# Patient Record
Sex: MALE | Race: WHITE
[De-identification: names, ages, dates, MRNs, and addresses within clinical notes are randomized per-mention and may not be internally consistent; named-entity substitution may affect disease eponyms.]

---

## 2018-02-27 ENCOUNTER — HOSPITAL ENCOUNTER (EMERGENCY)
Dept: HOSPITAL 17 - PHED | Age: 58
Discharge: HOME | End: 2018-02-27
Payer: COMMERCIAL

## 2018-02-27 VITALS
OXYGEN SATURATION: 99 % | HEART RATE: 94 BPM | TEMPERATURE: 98.4 F | RESPIRATION RATE: 18 BRPM | SYSTOLIC BLOOD PRESSURE: 155 MMHG | DIASTOLIC BLOOD PRESSURE: 80 MMHG

## 2018-02-27 VITALS — BODY MASS INDEX: 22.84 KG/M2 | WEIGHT: 163.14 LBS | HEIGHT: 71 IN

## 2018-02-27 DIAGNOSIS — L08.89: Primary | ICD-10-CM

## 2018-02-27 DIAGNOSIS — Z23: ICD-10-CM

## 2018-02-27 PROCEDURE — 90471 IMMUNIZATION ADMIN: CPT

## 2018-02-27 PROCEDURE — 90714 TD VACC NO PRESV 7 YRS+ IM: CPT

## 2018-02-27 NOTE — PD
HPI


Chief Complaint:  Bite or Sting


Time Seen by Provider:  20:13


Travel History


International Travel<30 days:  No


Contact w/Intl Traveler<30days:  No


Traveled to known affect area:  No





History of Present Illness


HPI


57-year-old male that presents to the ED for evaluation of possible bite to his 

left middle finger.  Per patient this happened about a week ago.  Per patient 

he had 2 puncture wounds.  He wasn't sure what bit him.  He was seen on 22 December in [facility] and had an x-ray and off for pain medication.  Per 

patient he was doing okay except that his been having some redness and 

difficulty flexing the finger.  Denies any numbness, tilling, weakness.  No 

fevers chills or sweats.  He is a diabetic.  He has not gotten a tetanus 

booster in some time.  He has no allergies to medication.  No other medical 

issues.  Pain per patient is 6 out of 10 and gets worse with bending of the 

digit.  Puncture wounds are to the medial aspect of the left third digit.





PFSH


Past Medical History


Depression:  Yes


High Cholesterol:  Yes


Cerebrovascular Accident:  No


Diabetes:  Yes


Patient Takes Glucophage:  No


Diminished Hearing:  No


Gastrointestinal Disorders:  No


Headaches:  Yes


Hypertension:  Yes


Immunizations Current:  Yes


Myocardial Infarction:  No


Renal Failure:  No


Pregnant?:  Not Pregnant





Past Surgical History


Abdominal Surgery:  Yes (hernia repair)





Social History


Alcohol Use:  No


Tobacco Use:  No


Substance Use:  No





Allergies-Medications


(Allergen,Severity, Reaction):  


Coded Allergies:  


     No Known Allergies (Verified , 5/3/15)


Reported Meds & Prescriptions





Reported Meds & Active Scripts


Active


Diclofenac Sodium DR (Diclofenac Sodium) 75 Mg Tabdr 75 Mg PO BID PRN


Bactrim DS (Sulfamethoxazole-Trimethoprim) 800-160 Mg Tab 1 Tab PO BID 10 Days


Diclofenac Potassium 50 Mg Tab 1 Tab PO Q8 PRN


Reported


Imitrex (Sumatriptan Succinate) 50 Mg Tab 50 Mg PO BIDPRN


Cymbalta (Duloxetine HCl) 30 Mg Cap 30 Mg PO DAILY


Gabapentin 600 Mg Tab 600 Mg PO DAILY


Wellbutrin 150 Mg Tab Xl (Bupropion Hcl) 150 Mg Neeru 150 Mg PO DAILY


Diovan (Valsartan) 320 Mg Tab 320 Mg PO DAILY


Hctz (Hydrochlorothiazide) 25 Mg Tab 25 Mg PO DAILY


Levemir (Insulin Detemir)  Inj 20 SC HS


Glucophage XR 24 HR (Metformin HCl) 500 Mg Tab 500 Mg PO BIDPC








Review of Systems


Except as stated in HPI:  all other systems reviewed are Neg





Physical Exam


Narrative


GENERAL: 


SKIN: Warm and dry.


HEAD: Atraumatic. Normocephalic. 


EYES: Pupils equal and round. No scleral icterus. No injection or drainage. 


ENT: No nasal bleeding or discharge.  Mucous membranes pink and moist.  Tongue 

is midline.  No uvula deviation.


NECK: Trachea midline. No JVD. 


CARDIOVASCULAR: Regular rate and rhythm.  


RESPIRATORY: No accessory muscle use. Clear to auscultation. Breath sounds 

equal bilaterally. 


GASTROINTESTINAL: Abdomen soft, non-tender, nondistended. Hepatic and splenic 

margins not palpable. 


MUSCULOSKELETAL: Extremities without clubbing, cyanosis, or edema. No obvious 

deformities.  Full range of motion of the upper and lower extremities 

bilaterally.  Patient has difficulty flexing the left third digit but able to 

do it.  Patient does have 2 puncture wounds that appear to have erythema on the 

lateral aspect of the third digit around the MIP area.  Some purulence noted on 

the most palmar puncture wound.  Minimal however.  No obvious tendon injury 

deformity or injury.  No foreign body noted.


NEUROLOGICAL: Awake and alert. No obvious cranial nerve deficits.  Motor 

grossly within normal limits. Five out of 5 muscle strength in the arms and 

legs.  Normal speech.


PSYCHIATRIC: Appropriate mood and affect; insight and judgment normal.





Data


Data


Last Documented VS





Vital Signs








  Date Time  Temp Pulse Resp B/P (MAP) Pulse Ox O2 Delivery O2 Flow Rate FiO2


 


2/27/18 19:30 98.4 94 18 155/80 (105) 99   








Orders





 Orders


Sulfamet-Trimeth Ds 800-160 Mg (Bactrim (2/27/18 20:30)


Tetanus/Diphtheria Tox Adult (Tetanus/Di (2/27/18 20:30)


Ed Discharge Order (2/27/18 20:36)








MDM


Medical Decision Making


Medical Screen Exam Complete:  Yes


Emergency Medical Condition:  Yes


Medical Record Reviewed:  Yes


Differential Diagnosis


Cellulitis versus wound infection versus puncture wound


Narrative Course


57-year-old male that presents to the ED for evaluation of pain and swelling to 

the left middle finger.  Patient was properly examined and was found to have 

signs and symptoms very consistent what appears to be infected wound to the 

left middle finger.  This time patient will be started on antibiotics.  Patient 

able to move the finger do not suspect tenosynovitis at this time.  Patient had 

an x-ray another facility that was negative for foreign body.  Patient will be 

given tetanus booster.  Given prescription for Keflex and for pain.  Told to 

follow up closely with PCP.  See ED worsening symptoms.





Diagnosis





 Primary Impression:  


 Infected wound


Patient Instructions:  General Instructions





***Additional Instructions:  


Take medications as prescribed.  Ice or warm compresses.  Close follow-up in 48 

hours in no improvement at all.  See ED worsening symptoms.  Follow with PCP.


***Med/Other Pt SpecificInfo:  Prescription(s) given


Scripts


Diclofenac Sodium DR (Diclofenac Sodium DR) 75 Mg Tabdr


75 MG PO BID Y for PAIN SCALE 1 TO 10, #20 TAB 0 Refills


   Prov: Keaton Medina MD         2/27/18 


Sulfamethoxazole-Trimethoprim (Bactrim DS) 800-160 Mg Tab


1 TAB PO BID for Infection for 10 Days, #20 TAB 0 Refills


   Prov: Keaton Medina MD         2/27/18


Disposition:  01 DISCHARGE HOME


Condition:  Stable











Jefe Ford Feb 27, 2018 20:42